# Patient Record
(demographics unavailable — no encounter records)

---

## 2017-06-07 NOTE — REP
Duplex extremity venous ultrasound: Left lower extremity.

 

History:  Left leg pain and swelling.  Question DVT.

 

Findings:  The deep veins are anechoic and fully compressible from the groin to

the popliteal fossa in the left lower extremity.  Color flow imaging is

homogeneous.  Spectral Doppler interrogation demonstrates intact respiratory

variation in flow and normal manual augmentation of flow.  There is no evidence

of deep vein thrombosis.

 

Impression:

 

Negative left lower extremity duplex venous ultrasound.  No evidence of deep vein

thrombosis.

 

 

Signed by

José Tian MD 06/07/2017 01:30 P

## 2017-10-02 NOTE — REP
MR BRAIN WITHOUT AND WITH CONTRAST:

 

HISTORY:  Third Nerve Palsy.

 

CONTRAST:  ProHance 17 mL.

 

An area of increased signal intensity on T2-weighted images is present in the

right basal ganglia and centrum semiovale.  This represents an old lacunar

infarction.  Areas of increased signal intensity on T2-weighted images are

present in the periventricular and subcortical white matter and jose g.  This

represents small vessel ischemic disease. There is no intraparenchymal

hemorrhage, acute infarct, mass, or midline shift.  There is no abnormal

enhancement.  The ventricular system and cortical sulci are dilated consistent

with mild volume loss.  There is no extracerebral collection.  The sinuses are

clear.

 

IMPRESSION:

 

1.  Old right basal ganglia and centrum semiovale lacunar infarction.

 

2.  Small vessel ischemic disease.

 

3.  Mild volume loss.

 

 

Signed by

Micah Santa MD 10/02/2017 02:05 P

## 2017-10-02 NOTE — REP
MRA BRAIN WITHOUT CONTRAST:

 

HISTORY:  Left Third Nerve Palsy.

 

3D time-of-flight MR angiography was performed at the level of the [place].  There is on aneurysm or arteriovenous malformation.  Mild

atherosclerotic disease involves the cavernous and supraclinoid internal carotid

arteries.  Major intracranial vessels are patent.  There is ectasia of the right

vertebral artery.  The right vertebral artery is dominant.

 

IMPRESSION:

 

1.  There is no aneurysm or arteriovenous malformation.

 

2.  Atherosclerotic disease as described above.

 

 

Signed by

Micah Santa MD 10/02/2017 02:25 P